# Patient Record
Sex: FEMALE | Race: WHITE | HISPANIC OR LATINO | Employment: FULL TIME | URBAN - METROPOLITAN AREA
[De-identification: names, ages, dates, MRNs, and addresses within clinical notes are randomized per-mention and may not be internally consistent; named-entity substitution may affect disease eponyms.]

---

## 2022-07-17 ENCOUNTER — HOSPITAL ENCOUNTER (EMERGENCY)
Facility: HOSPITAL | Age: 37
Discharge: HOME | End: 2022-07-17
Attending: EMERGENCY MEDICINE | Admitting: EMERGENCY MEDICINE
Payer: OTHER MISCELLANEOUS

## 2022-07-17 VITALS
HEART RATE: 68 BPM | OXYGEN SATURATION: 100 % | DIASTOLIC BLOOD PRESSURE: 78 MMHG | WEIGHT: 135 LBS | HEIGHT: 72 IN | TEMPERATURE: 98.6 F | BODY MASS INDEX: 18.28 KG/M2 | RESPIRATION RATE: 18 BRPM | SYSTOLIC BLOOD PRESSURE: 118 MMHG

## 2022-07-17 DIAGNOSIS — S61.217A LACERATION OF LEFT LITTLE FINGER W/O FOREIGN BODY W/O DAMAGE TO NAIL, INITIAL ENCOUNTER: ICD-10-CM

## 2022-07-17 PROCEDURE — 99283 EMERGENCY DEPT VISIT LOW MDM: CPT

## 2022-07-17 PROCEDURE — 12001 RPR S/N/AX/GEN/TRNK 2.5CM/<: CPT

## 2022-07-17 NOTE — ED PROVIDER NOTES
EMERGENCY DEPARTMENT ENCOUNTER      NAME: David Thornton  AGE: 37 year old female  YOB: 1985  MRN: [id number]  EVALUATION DATE & TIME: 2022 12:46 AM    PCP: No primary care provider on file.    ED PROVIDER: Reena Hadley M.D.      Chief Complaint   Patient presents with     left pinky cut         FINAL IMPRESSION:  1. Laceration of left little finger w/o foreign body w/o damage to nail, initial encounter        MEDICAL DECISION MAKIN:19 AM I met with the patient, obtained history, performed an initial exam, and discussed options and plan for diagnostics and treatment here in the ED. PPE worn: cloth mask, surgical mask, eye protection and nitrile gloves.  1:33 AM Performed laceration repair. Patient tolerated the procedure well. Discussed discharge plans along with return precautions. Patient was agreeable with plan.         Pertinent Labs & Imaging studies reviewed. (See chart for details)    David Thornton is a 37 year old female who presents with pinky laceration of her nondominant hand.  Bleeding is controlled.  She is not on blood thinning medication.  No foreign bodies.  Tetanus is up-to-date.  We will plan repair with sutures    She tolerated the procedure well.  No need for prophylactic antibiotics.  We discussed warning signs and indications to return for wound check.  Follow-up for suture removal in 10 days.    =================================================================    HPI    Patient information was obtained from: patient     Use of : Use of : N/A      David Thornton is a 37 year old female with a history of tobacco use disorder, who presents with left pinky laceration.    Patient sustained a laceration to her left pinky during work by a serrated knife. She does have bleeding controlled prior to arrival and is wrapped with gauze. She is not on any blood thinners. She has no limited range of motion to fingers. No other injuries sustained. Last Tdap  "was in 2019. No other medical complaints at this time.    REVIEW OF SYSTEMS   Review of Systems   Musculoskeletal:        Positive left pinky laceration   All other systems reviewed and are negative.       PAST MEDICAL HISTORY:  History reviewed. No pertinent past medical history.    PAST SURGICAL HISTORY:  History reviewed. No pertinent surgical history.    CURRENT MEDICATIONS:    No current facility-administered medications for this encounter.  No current outpatient medications on file.    ALLERGIES:  Allergies   Allergen Reactions     Codeine Anaphylaxis     Vicodin [Hydrocodone-Acetaminophen] Itching       FAMILY HISTORY:  History reviewed. No pertinent family history.    SOCIAL HISTORY:   Social History     Tobacco Use     Smoking status: Smoker, Current Status Unknown     Smokeless tobacco: Never Used   Substance Use Topics     Alcohol use: Not Currently        PHYSICAL EXAM:    Vitals: /75   Pulse 100   Temp 98.6  F (37  C) (Oral)   Resp 20   Ht 1.854 m (6' 1\")   Wt 61.2 kg (135 lb)   SpO2 100%   BMI 17.81 kg/m     General:. Alert and interactive, comfortable appearing.  HENT: Oropharynx without erythema or exudates. MMM.  TMs clear bilaterally.  Eyes: Pupils mid-sized and equally reactive.   Neck: Full AROM.  No midline tenderness to palpation.  Cardiovascular: Regular rate and rhythm.  Chest/Pulmonary: Normal work of breathing  Abdomen: Soft  Extremities: Left pinky finger tip avulsion injury that extends behind nail but does not include the nail, bleeding controlled. Normal ROM of all major joints. No lower extremity edema.   Skin: Warm and dry. Normal skin color.   Neuro: Speech clear. CNs grossly intact. Moves all extremities appropriately. Strength and sensation grossly intact to all extremities.   Psych: Normal affect/mood, cooperative, memory appropriate.    PROCEDURES:   PROCEDURE: Laceration Repair   INDICATIONS: Laceration   PROCEDURE PROVIDER: Dr Reena Hadley   SITE: Left pinky " finger tip   TYPE/SIZE: simple, clean and no foreign body visualized  1.75 cm (total length)   FUNCTIONAL ASSESSMENT: Distal sensation, circulation and motor intact   MEDICATION: 2 mLs of 1% Lidocaine with epinephrine   PREPARATION: Syringe lavage with Normal saline   DEBRIDEMENT: no debridement   CLOSURE:  Superficial layer closed with 6 stitches of 4-0 Prolene simple interrupted    Total number of sutures/staples placed: 6         I, Gayle Kraft, am serving as a scribe to document services personally performed by Dr. Reena Hadley  based on my observation and the provider's statements to me. I, Reena Hadley MD attest that Gayle Kraft is acting in a scribe capacity, has observed my performance of the services and has documented them in accordance with my direction.      Reena Hadley M.D.  Emergency Medicine  [facility] EMERGENCY DEPARTMENT  [facility] [facility] [place] [address]  [phone]  Dept: [phone]         Reena Hadley MD  07/17/22 1934

## 2022-07-17 NOTE — DISCHARGE INSTRUCTIONS
Please keep the wound covered.    Follow-up in 10 days for removal of the sutures.    Monitor for signs of infection which include increased swelling, pus draining from the wound, redness or pain.  Follow-up for wound check with any concerns

## 2023-08-02 ENCOUNTER — HOSPITAL ENCOUNTER (EMERGENCY)
Facility: HOSPITAL | Age: 38
Discharge: HOME | End: 2023-08-02
Attending: EMERGENCY MEDICINE | Admitting: EMERGENCY MEDICINE
Payer: OTHER MISCELLANEOUS

## 2023-08-02 ENCOUNTER — APPOINTMENT (OUTPATIENT)
Dept: CT IMAGING | Facility: HOSPITAL | Age: 38
End: 2023-08-02
Attending: EMERGENCY MEDICINE
Payer: OTHER MISCELLANEOUS

## 2023-08-02 VITALS
RESPIRATION RATE: 16 BRPM | SYSTOLIC BLOOD PRESSURE: 110 MMHG | OXYGEN SATURATION: 100 % | TEMPERATURE: 98.5 F | DIASTOLIC BLOOD PRESSURE: 65 MMHG | HEART RATE: 60 BPM

## 2023-08-02 DIAGNOSIS — M27.2 CHRONIC OSTEOMYELITIS OF MANDIBLE: ICD-10-CM

## 2023-08-02 DIAGNOSIS — S09.90XA MINOR CLOSED HEAD INJURY: ICD-10-CM

## 2023-08-02 DIAGNOSIS — S00.83XA FOREHEAD CONTUSION, INITIAL ENCOUNTER: ICD-10-CM

## 2023-08-02 DIAGNOSIS — S00.81XA FACIAL ABRASION, INITIAL ENCOUNTER: ICD-10-CM

## 2023-08-02 PROCEDURE — 70450 CT HEAD/BRAIN W/O DYE: CPT

## 2023-08-02 PROCEDURE — 99284 EMERGENCY DEPT VISIT MOD MDM: CPT | Mod: 25

## 2023-08-02 PROCEDURE — 70486 CT MAXILLOFACIAL W/O DYE: CPT

## 2023-08-02 ASSESSMENT — ENCOUNTER SYMPTOMS
NAUSEA: 0
VOMITING: 0
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
## 2023-08-02 NOTE — ED NOTES
CT tech requesting UPT prior to CT scan. Spoke with patient who declined UPT and states there is absolutely no chance of pregnancy. CT tech updated, patient will be marked ready for imaging.

## 2023-08-02 NOTE — DISCHARGE INSTRUCTIONS
Have your family be aware of what to watch for over the coming weeks for this head injury and reasons to return to the ER.    Otherwise, as we discussed we did have the CAT scan showing evidence for a chronic infection of your jaw from your teeth which needs to be addressed.  I would set up an appointment both with dentistry and oral maxillofacial surgeon to discuss your options for this.  Call your insurance to find a clinic near you that you can start care at otherwise I did place referrals for you in the system and you should receive a phone call within a few days to get scheduled for an appointment.    Return as needed for fevers.    For the wound on your face I recommend using an over-the-counter antibiotic such as Neosporin or bacitracin.  Those are fine to use by the eye.  Keep the wound out of the sun which is really the most important thing to avoid significant scarring and then keep it clean.  Do not go swimming until the wound is healed over.
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
## 2023-08-02 NOTE — Clinical Note
David Thornton was seen and treated in our emergency department on 8/2/2023.         Sincerely,     [facility] Emergency Department

## 2023-09-30 ENCOUNTER — HEALTH MAINTENANCE LETTER (OUTPATIENT)
Age: 38
End: 2023-09-30

## 2024-01-03 ENCOUNTER — APPOINTMENT (OUTPATIENT)
Dept: CT IMAGING | Facility: HOSPITAL | Age: 39
End: 2024-01-03
Attending: EMERGENCY MEDICINE
Payer: COMMERCIAL

## 2024-01-03 ENCOUNTER — HOSPITAL ENCOUNTER (EMERGENCY)
Facility: HOSPITAL | Age: 39
Discharge: HOME | End: 2024-01-03
Attending: EMERGENCY MEDICINE | Admitting: EMERGENCY MEDICINE
Payer: COMMERCIAL

## 2024-01-03 VITALS
RESPIRATION RATE: 18 BRPM | TEMPERATURE: 98.3 F | HEART RATE: 83 BPM | DIASTOLIC BLOOD PRESSURE: 64 MMHG | SYSTOLIC BLOOD PRESSURE: 109 MMHG | OXYGEN SATURATION: 100 %

## 2024-01-03 DIAGNOSIS — K04.7 DENTAL INFECTION: ICD-10-CM

## 2024-01-03 PROBLEM — S52.021K: Status: ACTIVE | Noted: 2019-03-12

## 2024-01-03 PROBLEM — G89.29 CHRONIC HAND PAIN, RIGHT: Status: ACTIVE | Noted: 2018-09-04

## 2024-01-03 PROBLEM — M79.641 CHRONIC HAND PAIN, RIGHT: Status: ACTIVE | Noted: 2018-09-04

## 2024-01-03 PROBLEM — F17.200 TOBACCO USE DISORDER: Status: ACTIVE | Noted: 2019-03-12

## 2024-01-03 LAB
ANION GAP SERPL CALCULATED.3IONS-SCNC: 9 MMOL/L (ref 7–15)
BASOPHILS # BLD AUTO: 0.1 10E3/UL (ref 0–0.2)
BASOPHILS NFR BLD AUTO: 1 %
BUN SERPL-MCNC: 8.8 MG/DL (ref 6–20)
CALCIUM SERPL-MCNC: 8.8 MG/DL (ref 8.6–10)
CHLORIDE SERPL-SCNC: 102 MMOL/L (ref 98–107)
CREAT SERPL-MCNC: 0.56 MG/DL (ref 0.51–0.95)
DEPRECATED HCO3 PLAS-SCNC: 25 MMOL/L (ref 22–29)
EGFRCR SERPLBLD CKD-EPI 2021: >90 ML/MIN/1.73M2
EOSINOPHIL # BLD AUTO: 0.4 10E3/UL (ref 0–0.7)
EOSINOPHIL NFR BLD AUTO: 4 %
ERYTHROCYTE [DISTWIDTH] IN BLOOD BY AUTOMATED COUNT: 12.5 % (ref 10–15)
GLUCOSE SERPL-MCNC: 83 MG/DL (ref 70–99)
HCT VFR BLD AUTO: 32.8 % (ref 35–47)
HGB BLD-MCNC: 10.7 G/DL (ref 11.7–15.7)
IMM GRANULOCYTES # BLD: 0 10E3/UL
IMM GRANULOCYTES NFR BLD: 0 %
LACTATE SERPL-SCNC: 0.6 MMOL/L (ref 0.7–2)
LYMPHOCYTES # BLD AUTO: 2.8 10E3/UL (ref 0.8–5.3)
LYMPHOCYTES NFR BLD AUTO: 28 %
MCH RBC QN AUTO: 28.8 PG (ref 26.5–33)
MCHC RBC AUTO-ENTMCNC: 32.6 G/DL (ref 31.5–36.5)
MCV RBC AUTO: 88 FL (ref 78–100)
MONOCYTES # BLD AUTO: 0.6 10E3/UL (ref 0–1.3)
MONOCYTES NFR BLD AUTO: 6 %
NEUTROPHILS # BLD AUTO: 6 10E3/UL (ref 1.6–8.3)
NEUTROPHILS NFR BLD AUTO: 61 %
NRBC # BLD AUTO: 0 10E3/UL
NRBC BLD AUTO-RTO: 0 /100
PLATELET # BLD AUTO: 406 10E3/UL (ref 150–450)
POTASSIUM SERPL-SCNC: 3.7 MMOL/L (ref 3.4–5.3)
RBC # BLD AUTO: 3.72 10E6/UL (ref 3.8–5.2)
SODIUM SERPL-SCNC: 136 MMOL/L (ref 135–145)
WBC # BLD AUTO: 9.8 10E3/UL (ref 4–11)

## 2024-01-03 PROCEDURE — 36415 COLL VENOUS BLD VENIPUNCTURE: CPT | Performed by: EMERGENCY MEDICINE

## 2024-01-03 PROCEDURE — 70487 CT MAXILLOFACIAL W/DYE: CPT

## 2024-01-03 PROCEDURE — 250N000013 HC RX MED GY IP 250 OP 250 PS 637: Performed by: EMERGENCY MEDICINE

## 2024-01-03 PROCEDURE — 250N000011 HC RX IP 250 OP 636: Performed by: EMERGENCY MEDICINE

## 2024-01-03 PROCEDURE — 80048 BASIC METABOLIC PNL TOTAL CA: CPT | Performed by: EMERGENCY MEDICINE

## 2024-01-03 PROCEDURE — 83605 ASSAY OF LACTIC ACID: CPT | Performed by: EMERGENCY MEDICINE

## 2024-01-03 PROCEDURE — 99285 EMERGENCY DEPT VISIT HI MDM: CPT | Mod: 25

## 2024-01-03 PROCEDURE — 85025 COMPLETE CBC W/AUTO DIFF WBC: CPT | Performed by: EMERGENCY MEDICINE

## 2024-01-03 RX ORDER — IOPAMIDOL 755 MG/ML
75 INJECTION, SOLUTION INTRAVASCULAR ONCE
Status: COMPLETED | OUTPATIENT
Start: 2024-01-03 | End: 2024-01-03

## 2024-01-03 RX ADMIN — AMOXICILLIN AND CLAVULANATE POTASSIUM 1 TABLET: 875; 125 TABLET, FILM COATED ORAL at 23:20

## 2024-01-03 RX ADMIN — IOPAMIDOL 75 ML: 755 INJECTION, SOLUTION INTRAVENOUS at 22:31

## 2024-01-03 ASSESSMENT — ACTIVITIES OF DAILY LIVING (ADL): ADLS_ACUITY_SCORE: 35

## 2024-01-04 NOTE — ED PROVIDER NOTES
EMERGENCY DEPARTMENT ENCOUNTER      NAME: David Thornton  AGE: 38 year old female  YOB: 1985  MRN: [id number]  EVALUATION DATE & TIME: No admission date for patient encounter.    PCP: Rafael Sigala    ED PROVIDER: João Mcgill D.O.      Chief Complaint   Patient presents with    Facial Swelling       FINAL IMPRESSION:  1. Dental infection        ED COURSE & MEDICAL DECISION MAKIN:17 PM I met with the patient to gather history and to perform my initial exam. I discussed the plan for care while in the Emergency Department.  11:09 PM We discussed the plan for discharge and the patient is agreeable. Reviewed supportive cares, symptomatic treatment, outpatient follow up, and reasons to return to the Emergency Department. All questions and concerns were addressed. Patient to be discharged by ED RN.            Pertinent Labs & Imaging studies reviewed. (See chart for details)  38 year old female presents to the Emergency Department for evaluation of left-sided facial swelling.  Patient does not have any definitive dentition that appeared to be an obvious infectious source, however as she did have significant swelling of the gumline, was concern for the potential for an underlying abscess, I decided to perform a CT scan of the face.  CT scan does not show obvious abscess, but does show obvious extension of the dental infection, which I believe to be the cause of the patient's facial swelling.  Therefore will discharge on Augmentin and instructed her to follow-up with her dentist.  The patient verbalized understanding agreement this plan.  Return precautions were discussed.    Medical Decision Making  Obtained supplemental history:Supplemental history obtained?: No  Reviewed external records: External records reviewed?: No  Care impacted by chronic illness:Smoking / Nicotine Use  Care significantly affected by social determinants of health:N/A  Did you consider but not order tests?: Work up  "considered but not performed and documented in chart, if applicable  Did you interpret images independently?: Independent interpretation of ECG and images noted in documentation, when applicable.  Consultation discussion with other provider:Did you involve another provider (consultant, , pharmacy, etc.)?: No  Discharge. I prescribed additional prescription strength medication(s) as charted. See documentation for any additional details.    At the conclusion of the encounter I discussed the results of all of the tests and the disposition. The questions were answered. The patient or family acknowledged understanding and was agreeable with the care plan.      HPI    Patient information was obtained from: patient     Use of : N/A        David Thornton is a 38 year old female who presents with left sided facial swelling.    The patient presents with left side facial pain x 4 days and left side facial swelling since last night. She has had sinus issues in the past and per chart review had left facial fractures in 2005. The patient states she had a nose surgery in 2006. She denies facial swelling like this in the past. She does smoke but does not use alcohol. She is allergic to codeine and Vicodin but denies other allergies.     She denies other medical issues, dental pain, fever, or any other complaints at this time.       Per Chart Review: The patient was seen on 5/31/2022 for evaluation of facial pain. CT facial bones was compared with CT from 2005. CT showed:   \"1. No acute fracture.  2. Chronic fractures left orbital floor and medial orbital wall.  3. Extensive dental disease with periapical lucencies involving tooth 29 and the remaining left maxillary molar. Sclerotic change within the right mandibular body and left maxilla is nonspecific but favored to be secondary to odontogenic disease.  4. Diffuse sinus mucosal disease as above.\"      REVIEW OF SYSTEMS  Constitutional:  Denies fever, chills, weight " loss or weakness  Eyes:  No pain, discharge, redness  HENT:  Denies sore throat, ear pain, congestion. Positive for left facial pain and swelling.  Respiratory: No SOB, wheeze or cough  Cardiovascular:  No CP, palpitations  GI:  Denies abdominal pain, nausea, vomiting, diarrhea  : Denies dysuria, hematuria  Musculoskeletal:  Denies any new muscle/joint pain, swelling or loss of function.  Skin:  Denies rash, pallor  Neurologic:  Denies headache, focal weakness or sensory changes  Lymph: Denies swollen nodes    All other systems negative unless noted in HPI.    PAST MEDICAL HISTORY:  History reviewed. No pertinent past medical history.    PAST SURGICAL HISTORY:  History reviewed. No pertinent surgical history.      CURRENT MEDICATIONS:    No current facility-administered medications for this encounter.     Current Outpatient Medications   Medication    amoxicillin-clavulanate (AUGMENTIN) 875-125 MG tablet         ALLERGIES:  Allergies   Allergen Reactions    Codeine Anaphylaxis    Vicodin [Hydrocodone-Acetaminophen] Itching       FAMILY HISTORY:  History reviewed. No pertinent family history.    SOCIAL HISTORY:  Social History     Socioeconomic History    Marital status: Single   Tobacco Use    Smoking status: Smoker, Current Status Unknown    Smokeless tobacco: Never   Substance and Sexual Activity    Alcohol use: Not Currently       VITALS:  Patient Vitals for the past 24 hrs:   BP Temp Temp src Pulse Resp SpO2   01/03/24 2326 109/64 98.3  F (36.8  C) Oral 83 18 100 %   01/03/24 2027 114/60 98.9  F (37.2  C) Temporal 104 18 100 %       PHYSICAL EXAM    VITAL SIGNS: /64   Pulse 83   Temp 98.3  F (36.8  C) (Oral)   Resp 18   SpO2 100%     General Appearance: Well-appearing, well-nourished, no acute distress   Head:  Normocephalic, atraumatic  Eyes:  PERRL, conjunctiva/corneas clear, EOM's intact,  ENT:  Lips and tongue normal, membranes are moist. Just above left maxillary incisor the gum is swollen with  whitish color change.   Neck:  Normal ROM, symmetrical, trachea midline    Cardio:  Regular rhythm, no murmur, rub or gallop, 2+ pulses symmetric in all extremities. Borderline tachycardia.  Pulm:  Clear to auscultation bilaterally, respirations unlabored,  Musculoskeletal: Full ROM, no edema, no cyanosis, good ROM of major joints  Integument:  Warm, Dry, No erythema, No rash.    Neurologic:  Alert & oriented.  No focal deficits appreciated.  Ambulatory.  Psychiatric:  Affect normal, Judgment normal, Mood normal.      LABS  Results for orders placed or performed during the hospital encounter of 01/03/24 (from the past 24 hour(s))   Basic metabolic panel   Result Value Ref Range    Sodium 136 135 - 145 mmol/L    Potassium 3.7 3.4 - 5.3 mmol/L    Chloride 102 98 - 107 mmol/L    Carbon Dioxide (CO2) 25 22 - 29 mmol/L    Anion Gap 9 7 - 15 mmol/L    Urea Nitrogen 8.8 6.0 - 20.0 mg/dL    Creatinine 0.56 0.51 - 0.95 mg/dL    GFR Estimate >90 >60 mL/min/1.73m2    Calcium 8.8 8.6 - 10.0 mg/dL    Glucose 83 70 - 99 mg/dL   CBC with platelets + differential    Narrative    The following orders were created for panel order CBC with platelets + differential.  Procedure                               Abnormality         Status                     ---------                               -----------         ------                     CBC with platelets and d...[[id number]]  Abnormal            Final result                 Please view results for these tests on the individual orders.   Lactic acid whole blood   Result Value Ref Range    Lactic Acid 0.6 (L) 0.7 - 2.0 mmol/L   CBC with platelets and differential   Result Value Ref Range    WBC Count 9.8 4.0 - 11.0 10e3/uL    RBC Count 3.72 (L) 3.80 - 5.20 10e6/uL    Hemoglobin 10.7 (L) 11.7 - 15.7 g/dL    Hematocrit 32.8 (L) 35.0 - 47.0 %    MCV 88 78 - 100 fL    MCH 28.8 26.5 - 33.0 pg    MCHC 32.6 31.5 - 36.5 g/dL    RDW 12.5 10.0 - 15.0 %    Platelet Count 406 150 - 450 10e3/uL    %  Neutrophils 61 %    % Lymphocytes 28 %    % Monocytes 6 %    % Eosinophils 4 %    % Basophils 1 %    % Immature Granulocytes 0 %    NRBCs per 100 WBC 0 <1 /100    Absolute Neutrophils 6.0 1.6 - 8.3 10e3/uL    Absolute Lymphocytes 2.8 0.8 - 5.3 10e3/uL    Absolute Monocytes 0.6 0.0 - 1.3 10e3/uL    Absolute Eosinophils 0.4 0.0 - 0.7 10e3/uL    Absolute Basophils 0.1 0.0 - 0.2 10e3/uL    Absolute Immature Granulocytes 0.0 <=0.4 10e3/uL    Absolute NRBCs 0.0 10e3/uL   CT Facial Bones with Contrast    Narrative    EXAM: CT FACIAL BONES WITH CONTRAST  LOCATION: [facility]  DATE: 1/3/2024    INDICATION: Concern for left facial abscess. Left facial swelling and pain.  COMPARISON: 05/31/2022  CONTRAST: 75ml isovue 370  TECHNIQUE: Routine CT Maxillofacial with IV contrast. Multiplanar reformats. Dose reduction techniques were used.     FINDINGS:  OSSEOUS STRUCTURES/SOFT TISSUES: There is asymmetric soft tissue thickening/inflammatory change extending along the left nasolabial fold and more laterally over the left maxilla. No definite drainable fluid collection is identified. There is multifocal   dental decay including evidence of apical periodontitis with cortical thinning or dehiscence associated with tooth #10. No acute facial bone fracture. There is a chronic fracture deformity involving the left orbital floor.     ORBITAL CONTENTS: No acute abnormality.    SINUSES: No paranasal sinus mucosal disease.    VISUALIZED INTRACRANIAL CONTENTS: No acute abnormality.       Impression    IMPRESSION: There is evidence of nonspecific cellulitis involving the left face extending along the left nasolabial fold and over the left maxilla. No convincing drainable fluid collection is identified. Etiology of cellulitis may be odontogenic with   evidence of multifocal dental decay including periapical lucency and cortical dehiscence associated with apical periodontitis involving tooth #10.         RADIOLOGY  CT  Facial Bones with Contrast   Final Result   IMPRESSION: There is evidence of nonspecific cellulitis involving the left face extending along the left nasolabial fold and over the left maxilla. No convincing drainable fluid collection is identified. Etiology of cellulitis may be odontogenic with    evidence of multifocal dental decay including periapical lucency and cortical dehiscence associated with apical periodontitis involving tooth #10.               MEDICATIONS GIVEN IN THE EMERGENCY:  Medications   iopamidol (ISOVUE-370) solution 75 mL (75 mLs Intravenous $Given 1/3/24 2231)   amoxicillin-clavulanate (AUGMENTIN) 875-125 MG per tablet 1 tablet (1 tablet Oral $Given 1/3/24 2320)       NEW PRESCRIPTIONS STARTED AT TODAY'S ER VISIT  Discharge Medication List as of 1/3/2024 11:14 PM        START taking these medications    Details   amoxicillin-clavulanate (AUGMENTIN) 875-125 MG tablet Take 1 tablet by mouth 2 times daily for 7 days, Disp-14 tablet, R-0, Local Print              I, Jose Alberto Staton, am serving as a scribe to document services personally performed by João Mcgill D.O., based on my observations and the provider's statements to me.  I, João Mcgill D.O., attest that Jose Alberto Staton is acting in a scribe capacity, has observed my performance of the services and has documented them in accordance with my direction.     João Mcgill D.O.  Emergency Medicine  [facility] EMERGENCY DEPARTMENT  [address] [place] [id number]  [phone]  Dept: [phone]       João Mcgill,   01/04/24 0137

## 2024-11-17 ENCOUNTER — HEALTH MAINTENANCE LETTER (OUTPATIENT)
Age: 39
End: 2024-11-17